# Patient Record
Sex: FEMALE | Race: ASIAN | NOT HISPANIC OR LATINO | URBAN - METROPOLITAN AREA
[De-identification: names, ages, dates, MRNs, and addresses within clinical notes are randomized per-mention and may not be internally consistent; named-entity substitution may affect disease eponyms.]

---

## 2019-08-09 ENCOUNTER — APPOINTMENT (OUTPATIENT)
Dept: URBAN - METROPOLITAN AREA CLINIC 282 | Age: 29
Setting detail: DERMATOLOGY
End: 2019-08-13

## 2019-08-09 DIAGNOSIS — L21.8 OTHER SEBORRHEIC DERMATITIS: ICD-10-CM

## 2019-08-09 DIAGNOSIS — L65.9 NONSCARRING HAIR LOSS, UNSPECIFIED: ICD-10-CM

## 2019-08-09 DIAGNOSIS — Q82.5 CONGENITAL NON-NEOPLASTIC NEVUS: ICD-10-CM

## 2019-08-09 PROCEDURE — 99203 OFFICE O/P NEW LOW 30 MIN: CPT

## 2019-08-09 PROCEDURE — OTHER COUNSELING: OTHER

## 2019-08-09 PROCEDURE — OTHER PRESCRIPTION: OTHER

## 2019-08-09 PROCEDURE — OTHER MIPS QUALITY: OTHER

## 2019-08-09 PROCEDURE — OTHER TREATMENT REGIMEN: OTHER

## 2019-08-09 PROCEDURE — OTHER ORDER TESTS: OTHER

## 2019-08-09 RX ORDER — FLUOCINOLONE ACETONIDE 0.11 MG/ML
OIL TOPICAL
Qty: 1 | Refills: 3 | Status: ERX | COMMUNITY
Start: 2019-08-09

## 2019-08-09 RX ORDER — CICLOPIROX 1 %
SHAMPOO TOPICAL
Qty: 1 | Refills: 3 | Status: ERX | COMMUNITY
Start: 2019-08-09

## 2019-08-09 ASSESSMENT — LOCATION DETAILED DESCRIPTION DERM
LOCATION DETAILED: RIGHT CENTRAL LATERAL NECK
LOCATION DETAILED: RIGHT CENTRAL PARIETAL SCALP
LOCATION DETAILED: RIGHT LATERAL MALAR CHEEK
LOCATION DETAILED: MID-FRONTAL SCALP
LOCATION DETAILED: RIGHT LATERAL SUPERIOR EYELID
LOCATION DETAILED: MID-OCCIPITAL SCALP
LOCATION DETAILED: LEFT CENTRAL PARIETAL SCALP
LOCATION DETAILED: RIGHT FOREHEAD
LOCATION DETAILED: RIGHT SUPERIOR PARIETAL SCALP
LOCATION DETAILED: RIGHT LATERAL DORSAL FOOT
LOCATION DETAILED: RIGHT DISTAL CALF

## 2019-08-09 ASSESSMENT — LOCATION SIMPLE DESCRIPTION DERM
LOCATION SIMPLE: SCALP
LOCATION SIMPLE: RIGHT SUPERIOR EYELID
LOCATION SIMPLE: RIGHT FOREHEAD
LOCATION SIMPLE: POSTERIOR SCALP
LOCATION SIMPLE: RIGHT FOOT
LOCATION SIMPLE: ANTERIOR SCALP
LOCATION SIMPLE: RIGHT CALF
LOCATION SIMPLE: NECK
LOCATION SIMPLE: RIGHT CHEEK

## 2019-08-09 ASSESSMENT — LOCATION ZONE DERM
LOCATION ZONE: FEET
LOCATION ZONE: FACE
LOCATION ZONE: LEG
LOCATION ZONE: NECK
LOCATION ZONE: EYELID
LOCATION ZONE: SCALP

## 2019-08-09 NOTE — HPI: HAIR LOSS
Previous Labs: No
How Did The Hair Loss Occur?: sudden in onset
How Severe Is Your Hair Loss?: moderate
What Hair Products Do You Use?: Head and shoulders shampoo

## 2019-08-09 NOTE — HPI: SKIN LESION (BIRTHMARK)
How Severe Is Your Birthmark?: severe
Is This A New Presentation, Or A Follow-Up?: Follow Up Capillary Malformation
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 \\n\\n[place] and just wanting to get established here in the [place]. 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 Patient does wear glasses but has not been evaluated in [place] for [place].

## 2019-08-09 NOTE — PROCEDURE: TREATMENT REGIMEN
Plan: We discussed I will consult with my attending.\\n\\nAFTER DISCUSSING WITH DR. ROTHMAN IT WAS AGREED THAT THIS PATIENT NEEDS MULTISPECIALITY APPROACH - NEURO, PSYCH, OPTHAMOLOGY AND COMPLEX DERMATOLOGY CLINIC. RECOMMENDED REFERRAL TO [facility] OR [facility]. Plan: We discussed I will consult with my attending.\\n\\nAFTER DISCUSSING WITH DR. ROTHMAN IT WAS AGREED THAT THIS PATIENT NEEDS MULTISPECIALITY APPROACH - NEURO, PSYCH, OPTHAMOLOGY AND COMPLEX DERMATOLOGY CLINIC. RECOMMENDED REFERRAL TO [facility] OR [facility].

## 2019-08-09 NOTE — PROCEDURE: TREATMENT REGIMEN
Plan: Recommended to establish care with PCP and gave her patient labs order to see PCP can draw them up

## 2019-08-09 NOTE — PROCEDURE: TREATMENT REGIMEN
Initiate Treatment: Derma-Smoothe/FS Scalp Oil 0.01 % Sig: Apply on scalp as needed at bed time 2-3 times a week and wash off in the AM. Use PRN itching\\n\\nciclopirox 1 % shampoo Sig: Apply to the scalp 15 min before shower then rinse 2 times a week

## 2020-10-15 ENCOUNTER — TESTING ONLY (OUTPATIENT)
Dept: URBAN - METROPOLITAN AREA CLINIC 33 | Facility: CLINIC | Age: 30
End: 2020-10-15

## 2020-10-15 DIAGNOSIS — H52.13 MYOPIA, BILATERAL: Primary | ICD-10-CM

## 2020-10-15 ASSESSMENT — INTRAOCULAR PRESSURE
OD: 18
OS: 12

## 2020-10-15 ASSESSMENT — VISUAL ACUITY
OS: 20/20
OD: 20/20

## 2021-08-11 ENCOUNTER — APPOINTMENT (OUTPATIENT)
Dept: URBAN - METROPOLITAN AREA CLINIC 282 | Age: 31
Setting detail: DERMATOLOGY
End: 2021-08-11

## 2021-08-11 DIAGNOSIS — D485 NEOPLASM OF UNCERTAIN BEHAVIOR OF SKIN: ICD-10-CM

## 2021-08-11 DIAGNOSIS — Q82.5 CONGENITAL NON-NEOPLASTIC NEVUS: ICD-10-CM

## 2021-08-11 PROBLEM — D48.5 NEOPLASM OF UNCERTAIN BEHAVIOR OF SKIN: Status: ACTIVE | Noted: 2021-08-11

## 2021-08-11 PROBLEM — M79.9 SOFT TISSUE DISORDER, UNSPECIFIED: Status: ACTIVE | Noted: 2021-08-11

## 2021-08-11 PROCEDURE — OTHER COUNSELING: OTHER

## 2021-08-11 PROCEDURE — OTHER BIOPSY BY SHAVE METHOD: OTHER

## 2021-08-11 PROCEDURE — OTHER MIPS QUALITY: OTHER

## 2021-08-11 PROCEDURE — 99214 OFFICE O/P EST MOD 30 MIN: CPT | Mod: 25

## 2021-08-11 PROCEDURE — 11102 TANGNTL BX SKIN SINGLE LES: CPT

## 2021-08-11 PROCEDURE — 11103 TANGNTL BX SKIN EA SEP/ADDL: CPT

## 2021-08-11 ASSESSMENT — LOCATION ZONE DERM
LOCATION ZONE: NOSE
LOCATION ZONE: FACE

## 2021-08-11 ASSESSMENT — LOCATION DETAILED DESCRIPTION DERM
LOCATION DETAILED: NASAL ROOT
LOCATION DETAILED: RIGHT INFERIOR CENTRAL MALAR CHEEK

## 2021-08-11 ASSESSMENT — LOCATION SIMPLE DESCRIPTION DERM
LOCATION SIMPLE: RIGHT CHEEK
LOCATION SIMPLE: NOSE

## 2021-08-11 NOTE — PROCEDURE: BIOPSY BY SHAVE METHOD
Billing Type: Third-Party Bill
Hide Accession Number?: No
Anesthesia Type: 1% lidocaine with epinephrine
Was A Bandage Applied: Yes
Biopsy Method: Dermablade
Anesthesia Volume In Cc (Will Not Render If 0): 0.5
Notification Instructions: Patient will be notified of biopsy results. However, patient instructed to call the office if not contacted within 2 weeks.
Curettage Text: The wound bed was treated with curettage after the biopsy was performed.
Biopsy Type: H and E
Size Of Lesion In Cm: 0
Depth Of Biopsy: dermis
Electrodesiccation And Curettage Text: The wound bed was treated with electrodesiccation and curettage after the biopsy was performed.
Post-Care Instructions: I reviewed with the patient in detail post-care instructions. Patient is to keep the biopsy site dry overnight, and then apply bacitracin twice daily until healed. Patient may apply hydrogen peroxide soaks to remove any crusting.
Dressing: bandage
Information: Selecting Yes will display possible errors in your note based on the variables you have selected. This validation is only offered as a suggestion for you. PLEASE NOTE THAT THE VALIDATION TEXT WILL BE REMOVED WHEN YOU FINALIZE YOUR NOTE. IF YOU WANT TO FAX A PRELIMINARY NOTE YOU WILL NEED TO TOGGLE THIS TO 'NO' IF YOU DO NOT WANT IT IN YOUR FAXED NOTE.
Cryotherapy Text: The wound bed was treated with cryotherapy after the biopsy was performed.
Detail Level: Detailed
Hemostasis: Drysol
Silver Nitrate Text: The wound bed was treated with silver nitrate after the biopsy was performed.
Electrodesiccation Text: The wound bed was treated with electrodesiccation after the biopsy was performed.
Type Of Destruction Used: Curettage
Wound Care: Petrolatum
Consent: Written consent was obtained and risks were reviewed including but not limited to scarring, infection, bleeding, scabbing, incomplete removal, nerve damage and allergy to anesthesia.

## 2021-08-11 NOTE — PROCEDURE: COUNSELING
Patient Specific Counseling (Will Not Stick From Patient To Patient): Patient was seen at [facility] but could not move forward with management reporting high out of pocket cost for imaging\\nDiscussed with patient that management of Capillary malformations with overgrowth are not considered cosmetic and may be amenable to some laser therapy or systemic/topical medicaitons\\nPatient instructed to provide [facility] records before additional actions are taken Patient Specific Counseling (Will Not Stick From Patient To Patient): Patient was seen at [facility] but could not move forward with management reporting high out of pocket cost for imaging\\nDiscussed with patient that management of Capillary malformations with overgrowth are not considered cosmetic and may be amenable to some laser therapy or systemic/topical medicaitons\\nPatient instructed to provide [facility] records before additional actions are taken

## 2021-08-11 NOTE — PROCEDURE: COUNSELING
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

## 2021-09-15 ENCOUNTER — APPOINTMENT (OUTPATIENT)
Dept: URBAN - METROPOLITAN AREA CLINIC 282 | Age: 31
Setting detail: DERMATOLOGY
End: 2021-09-17

## 2021-09-15 DIAGNOSIS — I99.8 OTHER DISORDER OF CIRCULATORY SYSTEM: ICD-10-CM

## 2021-09-15 PROCEDURE — OTHER COUNSELING: OTHER

## 2021-09-15 PROCEDURE — OTHER ORDER MRI: OTHER

## 2021-09-15 PROCEDURE — OTHER TREATMENT REGIMEN: OTHER

## 2021-09-15 PROCEDURE — 99214 OFFICE O/P EST MOD 30 MIN: CPT

## 2021-09-15 PROCEDURE — OTHER ORDER ULTRASOUND: OTHER

## 2021-09-15 ASSESSMENT — LOCATION ZONE DERM: LOCATION ZONE: FACE

## 2021-09-15 ASSESSMENT — LOCATION DETAILED DESCRIPTION DERM
LOCATION DETAILED: RIGHT SUPERIOR LATERAL BUCCAL CHEEK
LOCATION DETAILED: RIGHT CENTRAL ZYGOMA
LOCATION DETAILED: RIGHT INFERIOR LATERAL MALAR CHEEK
LOCATION DETAILED: RIGHT CENTRAL BUCCAL CHEEK
LOCATION DETAILED: RIGHT INFERIOR CENTRAL MALAR CHEEK
LOCATION DETAILED: RIGHT CENTRAL MALAR CHEEK

## 2021-09-15 ASSESSMENT — LOCATION SIMPLE DESCRIPTION DERM
LOCATION SIMPLE: RIGHT ZYGOMA
LOCATION SIMPLE: RIGHT CHEEK

## 2021-09-15 NOTE — PROCEDURE: ORDER ULTRASOUND
Other Protocol: MRI, MRA & MRV With and without contrast and C spine with and without contrast.
Provider: Merlin Joseph MD
Neck Ultrasound Reason: Vascular malformation
Priority: normal
Ultrasound Protocol: Ultrasound of Neck
Detail Level: Simple

## 2021-09-15 NOTE — PROCEDURE: TREATMENT REGIMEN
Plan: Patient given order for right cheek and neck ultra sound.\\nPatient given order for  MRI, MRV, and MRA on head and neck with and without contrast, c spine with and Without contrast. \\n\\nPatient has a referral from [facility] to [facility] patient advised to give CD and report of images to doctor she follows up at [facility] vascular malformation anomaly clinic. \\n\\nPatient was given the phone number to call over and show all previous records with images and records from us.\\n\\[phone] option 3 or 4.\\n\\nDiscussed with patient the option of Sirolimus cream. Patient wished to follow up with [facility] first for treatment. Plan: Patient given order for right cheek and neck ultra sound.\\nPatient given order for  MRI, MRV, and MRA on head and neck with and without contrast, c spine with and Without contrast. \\n\\nPatient has a referral from [facility] to [facility] patient advised to give CD and report of images to doctor she follows up at [facility] vascular malformation anomaly clinic. \\n\\nPatient was given the phone number to call over and show all previous records with images and records from us.\\n\\[phone] option 3 or 4.\\n\\nDiscussed with patient the option of Sirolimus cream. Patient wished to follow up with [facility] first for treatment.

## 2021-09-15 NOTE — PROCEDURE: ORDER MRI
Provider: Merlin Joseph MD
Perform At: [facility]
Other Protocol: MRI, MRA & MRV With and without contrast and C spine with and without contrast.
Detail Level: Detailed
Priority: normal

## 2021-09-15 NOTE — PROCEDURE: COUNSELING
Patient Specific Counseling (Will Not Stick From Patient To Patient): Patient referred to [facility] with Dr. Kyleigh Salomon by Dr Jamel Vega following evaluation at [facility]\\n\\nRecords obtained from [facility] were printed and contact information provided to patient to obtain consultation at [facility]\\n\\nRecommend she be considered for systemic therapy with sirolimus and for reconstructive surgery, also will need periodontal/dental care with providers familiar with capillary malformations and facial ovegrowth Patient Specific Counseling (Will Not Stick From Patient To Patient): Patient referred to [facility]ascular [facility]formation Clinic with Dr. Kyleigh Salomon by Dr Jamel Vega following evaluation at [facility]\\n\\nRecords obtained from [facility] were printed and contact information provided to patient to obtain consultation at 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

## 2021-09-22 ENCOUNTER — APPOINTMENT (OUTPATIENT)
Age: 31
Setting detail: DERMATOLOGY
End: 2021-09-27

## 2021-09-22 DIAGNOSIS — L20.89 OTHER ATOPIC DERMATITIS: ICD-10-CM

## 2021-09-22 PROBLEM — L20.84 INTRINSIC (ALLERGIC) ECZEMA: Status: ACTIVE | Noted: 2021-09-22

## 2021-09-22 PROCEDURE — OTHER PRESCRIPTION: OTHER

## 2021-09-22 PROCEDURE — 99214 OFFICE O/P EST MOD 30 MIN: CPT

## 2021-09-22 PROCEDURE — OTHER COUNSELING: OTHER

## 2021-09-22 PROCEDURE — OTHER PRESCRIPTION MEDICATION MANAGEMENT: OTHER

## 2021-09-22 RX ORDER — TACROLIMUS 1 MG/G
OINTMENT TOPICAL
Qty: 30 | Refills: 0 | Status: ERX | COMMUNITY
Start: 2021-09-22

## 2021-09-22 ASSESSMENT — LOCATION DETAILED DESCRIPTION DERM: LOCATION DETAILED: RIGHT NIPPLE

## 2021-09-22 ASSESSMENT — LOCATION ZONE DERM: LOCATION ZONE: TRUNK

## 2021-09-22 ASSESSMENT — LOCATION SIMPLE DESCRIPTION DERM: LOCATION SIMPLE: RIGHT BREAST

## 2021-09-22 NOTE — PROCEDURE: PRESCRIPTION MEDICATION MANAGEMENT
Initiate Treatment: Tacrolimus 0.1% ointment \\nSig: Apply to affected area on R nipple twice daily
Detail Level: Zone
Continue Regimen: VaniCream
Render In Strict Bullet Format?: No
Modify Regimen: Cotton bra\\nNipple pad\\nGentle, non-fragranced personal care and household products
Plan: Given unilateral presentation, if not responding to above measures will consider biopsy to rule out Paget's.

## 2021-09-22 NOTE — HPI: ITCHING
What Type Of Note Output Would You Prefer (Optional)?: Standard Output
On A Scale Of 0 To 10 How Would You Rate Your Itching?: 8
How Did Your Itching Occur?: gradual in onset  (over a period of years)
How Severe Is Your Itching?: mild
Additional History: Patient has been applying VaniCream moisturizer, which helps but only temporarily. She works from home so has not been wearing a bra for extended periods very frequently.

## 2021-12-08 ENCOUNTER — APPOINTMENT (OUTPATIENT)
Dept: URBAN - METROPOLITAN AREA CLINIC 282 | Age: 31
Setting detail: DERMATOLOGY
End: 2021-12-08

## 2021-12-08 DIAGNOSIS — L83 ACANTHOSIS NIGRICANS: ICD-10-CM

## 2021-12-08 DIAGNOSIS — I99.8 OTHER DISORDER OF CIRCULATORY SYSTEM: ICD-10-CM

## 2021-12-08 PROCEDURE — OTHER ORDER ULTRASOUND: OTHER

## 2021-12-08 PROCEDURE — 99213 OFFICE O/P EST LOW 20 MIN: CPT

## 2021-12-08 PROCEDURE — OTHER COUNSELING: OTHER

## 2021-12-08 PROCEDURE — OTHER TREATMENT REGIMEN: OTHER

## 2021-12-08 PROCEDURE — OTHER ORDER MRI: OTHER

## 2021-12-08 PROCEDURE — OTHER MIPS QUALITY: OTHER

## 2021-12-08 ASSESSMENT — LOCATION DETAILED DESCRIPTION DERM
LOCATION DETAILED: RIGHT INFERIOR CENTRAL MALAR CHEEK
LOCATION DETAILED: RIGHT INFERIOR LATERAL MALAR CHEEK
LOCATION DETAILED: RIGHT SUPERIOR LATERAL BUCCAL CHEEK
LOCATION DETAILED: RIGHT AXILLARY VAULT
LOCATION DETAILED: RIGHT CENTRAL BUCCAL CHEEK
LOCATION DETAILED: RIGHT CENTRAL MALAR CHEEK
LOCATION DETAILED: LEFT AXILLARY VAULT
LOCATION DETAILED: RIGHT CENTRAL ZYGOMA

## 2021-12-08 ASSESSMENT — LOCATION SIMPLE DESCRIPTION DERM
LOCATION SIMPLE: LEFT AXILLARY VAULT
LOCATION SIMPLE: RIGHT ZYGOMA
LOCATION SIMPLE: RIGHT AXILLARY VAULT
LOCATION SIMPLE: RIGHT CHEEK

## 2021-12-08 ASSESSMENT — LOCATION ZONE DERM
LOCATION ZONE: AXILLAE
LOCATION ZONE: FACE

## 2021-12-08 NOTE — PROCEDURE: TREATMENT REGIMEN
Plan: Patient given order for right cheek and neck ultra sound.\\nPatient given order for  MRI, MRV, and MRA on head and neck with and without contrast, c spine with and Without contrast. \\n\\nPatient has a referral from [facility] to [facility] patient advised to give CD and report of images to doctor she follows up at [facility] vascular malformation anomaly clinic. \\n\\nPatient was given the phone number to call over and show all previous records with images and records from us.\\n\\[phone] option 3 or 4.\\n\\nDiscussed with patient the option of Sirolimus cream. Patient wished to follow up with [facility] first for treatment. Plan: Patient given order for right cheek and neck ultra sound.\\nPatient given order for  MRI, MRV, and MRA on head and neck with and without contrast, c spine with and Without contrast. \\n\\nPatient has a referral from [facility] to [facility] patient advised to give CD and report of images to doctor she follows up at [facility] vascular malformation anomaly clinic. \\n\\nPatient was given the phone number to call over and show all previous records with images and records from us.\\n\\[phone] option 3 or 4.\\n\\nDiscussed with patient the option of Sirolimus cream. Patient wished to follow up with [facility] first for treatment.

## 2021-12-08 NOTE — PROCEDURE: ORDER MRI
Perform At: [facility]
Detail Level: Detailed
Provider: Merlin Joseph MD
Other Protocol: MRI, MRA & MRV With and without contrast and C spine with and without contrast.
Priority: normal

## 2021-12-08 NOTE — PROCEDURE: ORDER ULTRASOUND
Ultrasound Protocol: Ultrasound of Neck
Priority: normal
Neck Ultrasound Reason: Vascular malformation
Provider: Merlin Joseph MD
Detail Level: Simple
Other Protocol: MRI, MRA & MRV With and without contrast and C spine with and without contrast.

## 2021-12-08 NOTE — PROCEDURE: COUNSELING
Patient Specific Counseling (Will Not Stick From Patient To Patient): Patient has completed MRI of Head and Neck, no findings consistent with underlying vascular malformation\\n\\nProvided contact information to [facility] vascular malformation clinic and referred back to [facility] for multidisciplinary care for soft tissue overgrowth Patient Specific Counseling (Will Not Stick From Patient To Patient): Patient has completed MRI of Head and Neck, no findings consistent with underlying vascular malformation\\n\\nProvided contact information to [facility] vascular malformation clinic and referred back to [place] for multidisciplinary care for soft tissue overgrowth

## 2021-12-08 NOTE — PROCEDURE: COUNSELING
Patient Specific Counseling (Will Not Stick From Patient To Patient): Patient referred to [facility]formation [facility] with Dr. Kyleigh Salomon by Dr Jamel Vega following evaluation at [facility]\\n\\nRecords obtained from [facility] [place] were printed and contact information provided to patient to obtain consultation at [facility]\\n\\nRecommend she be considered for systemic therapy with sirolimus and for reconstructive surgery, also will need periodontal/dental care with providers familiar with capillary malformations and facial ovegrowth Patient Specific Counseling (Will Not Stick From Patient To Patient): Patient referred to [facility] Vascular [facility]formation Clinic with Dr. Kyleigh Salomon by Dr Jamel Vega following evaluation at [facility]\\n\\nRecords obtained from [facility] were printed and contact information provided to patient to obtain consultation at [facility] at [facility]\\n\\nRecommend she be considered for systemic therapy with sirolimus and for reconstructive surgery, also will need periodontal/dental care with providers familiar with capillary malformations and facial ovegrowth